# Patient Record
Sex: FEMALE | Race: WHITE | NOT HISPANIC OR LATINO | Employment: OTHER | URBAN - METROPOLITAN AREA
[De-identification: names, ages, dates, MRNs, and addresses within clinical notes are randomized per-mention and may not be internally consistent; named-entity substitution may affect disease eponyms.]

---

## 2018-03-24 ENCOUNTER — RESOLUTE PROFESSIONAL BILLING HOSPITAL PROF FEE (OUTPATIENT)
Dept: HOSPITALIST | Facility: MEDICAL CENTER | Age: 73
End: 2018-03-24
Payer: COMMERCIAL

## 2018-03-24 ENCOUNTER — APPOINTMENT (OUTPATIENT)
Dept: RADIOLOGY | Facility: MEDICAL CENTER | Age: 73
DRG: 193 | End: 2018-03-24
Attending: EMERGENCY MEDICINE
Payer: COMMERCIAL

## 2018-03-24 ENCOUNTER — HOSPITAL ENCOUNTER (INPATIENT)
Facility: MEDICAL CENTER | Age: 73
LOS: 1 days | DRG: 193 | End: 2018-03-25
Attending: EMERGENCY MEDICINE | Admitting: INTERNAL MEDICINE
Payer: COMMERCIAL

## 2018-03-24 DIAGNOSIS — J10.1 INFLUENZA A: ICD-10-CM

## 2018-03-24 DIAGNOSIS — J96.01 ACUTE RESPIRATORY FAILURE WITH HYPOXIA (HCC): ICD-10-CM

## 2018-03-24 PROBLEM — R79.89 ELEVATED BRAIN NATRIURETIC PEPTIDE (BNP) LEVEL: Status: ACTIVE | Noted: 2018-03-24

## 2018-03-24 PROBLEM — G20.A1 PARKINSON DISEASE (HCC): Status: ACTIVE | Noted: 2018-03-24

## 2018-03-24 LAB
ALBUMIN SERPL BCP-MCNC: 3.4 G/DL (ref 3.2–4.9)
ALBUMIN/GLOB SERPL: 1.2 G/DL
ALP SERPL-CCNC: 65 U/L (ref 30–99)
ALT SERPL-CCNC: 30 U/L (ref 2–50)
ANION GAP SERPL CALC-SCNC: 6 MMOL/L (ref 0–11.9)
AST SERPL-CCNC: 32 U/L (ref 12–45)
BASOPHILS # BLD AUTO: 0.3 % (ref 0–1.8)
BASOPHILS # BLD: 0.02 K/UL (ref 0–0.12)
BILIRUB SERPL-MCNC: 0.5 MG/DL (ref 0.1–1.5)
BNP SERPL-MCNC: 209 PG/ML (ref 0–100)
BUN SERPL-MCNC: 17 MG/DL (ref 8–22)
CALCIUM SERPL-MCNC: 8.7 MG/DL (ref 8.4–10.2)
CHLORIDE SERPL-SCNC: 101 MMOL/L (ref 96–112)
CO2 SERPL-SCNC: 25 MMOL/L (ref 20–33)
CREAT SERPL-MCNC: 0.68 MG/DL (ref 0.5–1.4)
EKG IMPRESSION: NORMAL
EOSINOPHIL # BLD AUTO: 0.07 K/UL (ref 0–0.51)
EOSINOPHIL NFR BLD: 1.2 % (ref 0–6.9)
ERYTHROCYTE [DISTWIDTH] IN BLOOD BY AUTOMATED COUNT: 50.5 FL (ref 35.9–50)
FLUAV+FLUBV AG SPEC QL IA: ABNORMAL
GLOBULIN SER CALC-MCNC: 2.8 G/DL (ref 1.9–3.5)
GLUCOSE SERPL-MCNC: 106 MG/DL (ref 65–99)
HCT VFR BLD AUTO: 31.9 % (ref 37–47)
HGB BLD-MCNC: 10.3 G/DL (ref 12–16)
IMM GRANULOCYTES # BLD AUTO: 0.02 K/UL (ref 0–0.11)
IMM GRANULOCYTES NFR BLD AUTO: 0.3 % (ref 0–0.9)
LV EJECT FRACT  99904: 75
LV EJECT FRACT MOD 2C 99903: 76.1
LV EJECT FRACT MOD 4C 99902: 73.2
LV EJECT FRACT MOD BP 99901: 73.67
LYMPHOCYTES # BLD AUTO: 0.58 K/UL (ref 1–4.8)
LYMPHOCYTES NFR BLD: 9.6 % (ref 22–41)
MCH RBC QN AUTO: 28 PG (ref 27–33)
MCHC RBC AUTO-ENTMCNC: 32.3 G/DL (ref 33.6–35)
MCV RBC AUTO: 86.7 FL (ref 81.4–97.8)
MONOCYTES # BLD AUTO: 0.45 K/UL (ref 0–0.85)
MONOCYTES NFR BLD AUTO: 7.4 % (ref 0–13.4)
NEUTROPHILS # BLD AUTO: 4.93 K/UL (ref 2–7.15)
NEUTROPHILS NFR BLD: 81.2 % (ref 44–72)
NRBC # BLD AUTO: 0 K/UL
NRBC BLD-RTO: 0 /100 WBC
PLATELET # BLD AUTO: 258 K/UL (ref 164–446)
PMV BLD AUTO: 8.9 FL (ref 9–12.9)
POTASSIUM SERPL-SCNC: 3.6 MMOL/L (ref 3.6–5.5)
PROT SERPL-MCNC: 6.2 G/DL (ref 6–8.2)
RBC # BLD AUTO: 3.68 M/UL (ref 4.2–5.4)
SIGNIFICANT IND 70042: ABNORMAL
SITE SITE: ABNORMAL
SODIUM SERPL-SCNC: 132 MMOL/L (ref 135–145)
SOURCE SOURCE: ABNORMAL
WBC # BLD AUTO: 6.1 K/UL (ref 4.8–10.8)

## 2018-03-24 PROCEDURE — 85025 COMPLETE CBC W/AUTO DIFF WBC: CPT

## 2018-03-24 PROCEDURE — 36415 COLL VENOUS BLD VENIPUNCTURE: CPT

## 2018-03-24 PROCEDURE — 700102 HCHG RX REV CODE 250 W/ 637 OVERRIDE(OP): Performed by: INTERNAL MEDICINE

## 2018-03-24 PROCEDURE — 93005 ELECTROCARDIOGRAM TRACING: CPT | Performed by: EMERGENCY MEDICINE

## 2018-03-24 PROCEDURE — 700101 HCHG RX REV CODE 250: Performed by: INTERNAL MEDICINE

## 2018-03-24 PROCEDURE — 770020 HCHG ROOM/CARE - TELE (206)

## 2018-03-24 PROCEDURE — 99285 EMERGENCY DEPT VISIT HI MDM: CPT

## 2018-03-24 PROCEDURE — A9270 NON-COVERED ITEM OR SERVICE: HCPCS | Performed by: INTERNAL MEDICINE

## 2018-03-24 PROCEDURE — 80053 COMPREHEN METABOLIC PANEL: CPT

## 2018-03-24 PROCEDURE — 700111 HCHG RX REV CODE 636 W/ 250 OVERRIDE (IP): Performed by: INTERNAL MEDICINE

## 2018-03-24 PROCEDURE — 71045 X-RAY EXAM CHEST 1 VIEW: CPT

## 2018-03-24 PROCEDURE — 700101 HCHG RX REV CODE 250: Performed by: EMERGENCY MEDICINE

## 2018-03-24 PROCEDURE — 94760 N-INVAS EAR/PLS OXIMETRY 1: CPT

## 2018-03-24 PROCEDURE — 93306 TTE W/DOPPLER COMPLETE: CPT | Mod: 26 | Performed by: INTERNAL MEDICINE

## 2018-03-24 PROCEDURE — 83880 ASSAY OF NATRIURETIC PEPTIDE: CPT

## 2018-03-24 PROCEDURE — 93306 TTE W/DOPPLER COMPLETE: CPT

## 2018-03-24 PROCEDURE — 99223 1ST HOSP IP/OBS HIGH 75: CPT | Performed by: INTERNAL MEDICINE

## 2018-03-24 PROCEDURE — 87400 INFLUENZA A/B EACH AG IA: CPT

## 2018-03-24 PROCEDURE — 94640 AIRWAY INHALATION TREATMENT: CPT

## 2018-03-24 RX ORDER — QUETIAPINE FUMARATE 50 MG/1
75 TABLET, FILM COATED ORAL
COMMUNITY

## 2018-03-24 RX ORDER — IPRATROPIUM BROMIDE 42 UG/1
2 SPRAY, METERED NASAL PRN
COMMUNITY

## 2018-03-24 RX ORDER — BUPROPION HYDROCHLORIDE 150 MG/1
450 TABLET ORAL EVERY MORNING
COMMUNITY

## 2018-03-24 RX ORDER — IRBESARTAN 300 MG/1
300 TABLET ORAL NIGHTLY
COMMUNITY

## 2018-03-24 RX ORDER — ALBUTEROL SULFATE 90 UG/1
2 AEROSOL, METERED RESPIRATORY (INHALATION)
Status: DISCONTINUED | OUTPATIENT
Start: 2018-03-24 | End: 2018-03-25

## 2018-03-24 RX ORDER — MULTIVIT WITH MINERALS/LUTEIN
1 TABLET ORAL 3 TIMES DAILY
COMMUNITY

## 2018-03-24 RX ORDER — FLUOXETINE HYDROCHLORIDE 20 MG/1
20 CAPSULE ORAL DAILY
COMMUNITY

## 2018-03-24 RX ORDER — OSELTAMIVIR PHOSPHATE 75 MG/1
75 CAPSULE ORAL ONCE
Status: DISCONTINUED | OUTPATIENT
Start: 2018-03-24 | End: 2018-03-24

## 2018-03-24 RX ORDER — HEPARIN SODIUM 5000 [USP'U]/ML
5000 INJECTION, SOLUTION INTRAVENOUS; SUBCUTANEOUS EVERY 8 HOURS
Status: DISCONTINUED | OUTPATIENT
Start: 2018-03-24 | End: 2018-03-25 | Stop reason: HOSPADM

## 2018-03-24 RX ORDER — BISACODYL 10 MG
10 SUPPOSITORY, RECTAL RECTAL
Status: DISCONTINUED | OUTPATIENT
Start: 2018-03-24 | End: 2018-03-25 | Stop reason: HOSPADM

## 2018-03-24 RX ORDER — DEXTROAMPHETAMINE SACCHARATE, AMPHETAMINE ASPARTATE, DEXTROAMPHETAMINE SULFATE AND AMPHETAMINE SULFATE 2.5; 2.5; 2.5; 2.5 MG/1; MG/1; MG/1; MG/1
15 TABLET ORAL 2 TIMES DAILY
Status: DISCONTINUED | OUTPATIENT
Start: 2018-03-24 | End: 2018-03-25

## 2018-03-24 RX ORDER — DEXTROAMPHETAMINE SACCHARATE, AMPHETAMINE ASPARTATE, DEXTROAMPHETAMINE SULFATE AND AMPHETAMINE SULFATE 3.75; 3.75; 3.75; 3.75 MG/1; MG/1; MG/1; MG/1
15 TABLET ORAL 2 TIMES DAILY
COMMUNITY

## 2018-03-24 RX ORDER — ACETAMINOPHEN 325 MG/1
650 TABLET ORAL EVERY 6 HOURS PRN
Status: DISCONTINUED | OUTPATIENT
Start: 2018-03-24 | End: 2018-03-25 | Stop reason: HOSPADM

## 2018-03-24 RX ORDER — SELEGILINE HYDROCHLORIDE 5 MG/1
5 TABLET ORAL 2 TIMES DAILY
Status: DISCONTINUED | OUTPATIENT
Start: 2018-03-24 | End: 2018-03-25 | Stop reason: HOSPADM

## 2018-03-24 RX ORDER — SELEGILINE HYDROCHLORIDE 5 MG/1
5 TABLET ORAL 2 TIMES DAILY
COMMUNITY

## 2018-03-24 RX ORDER — BUPROPION HYDROCHLORIDE 100 MG/1
200 TABLET, EXTENDED RELEASE ORAL 2 TIMES DAILY
Status: DISCONTINUED | OUTPATIENT
Start: 2018-03-25 | End: 2018-03-25

## 2018-03-24 RX ORDER — IPRATROPIUM BROMIDE AND ALBUTEROL SULFATE 2.5; .5 MG/3ML; MG/3ML
3 SOLUTION RESPIRATORY (INHALATION)
Status: DISCONTINUED | OUTPATIENT
Start: 2018-03-24 | End: 2018-03-25 | Stop reason: HOSPADM

## 2018-03-24 RX ORDER — MULTIVIT-MIN/IRON/FOLIC ACID/K 18-600-40
2000 CAPSULE ORAL 3 TIMES DAILY
COMMUNITY

## 2018-03-24 RX ORDER — IRBESARTAN 150 MG/1
300 TABLET ORAL NIGHTLY
Status: DISCONTINUED | OUTPATIENT
Start: 2018-03-24 | End: 2018-03-25 | Stop reason: HOSPADM

## 2018-03-24 RX ORDER — AMOXICILLIN 500 MG/1
500 CAPSULE ORAL 2 TIMES DAILY
Status: ON HOLD | COMMUNITY
End: 2018-03-24 | Stop reason: CLARIF

## 2018-03-24 RX ORDER — BUPROPION HYDROCHLORIDE 150 MG/1
450 TABLET ORAL EVERY MORNING
Status: DISCONTINUED | OUTPATIENT
Start: 2018-03-24 | End: 2018-03-24

## 2018-03-24 RX ORDER — OSELTAMIVIR PHOSPHATE 75 MG/1
75 CAPSULE ORAL EVERY 12 HOURS
Status: DISCONTINUED | OUTPATIENT
Start: 2018-03-24 | End: 2018-03-25 | Stop reason: HOSPADM

## 2018-03-24 RX ORDER — FLUOXETINE HYDROCHLORIDE 20 MG/1
20 CAPSULE ORAL DAILY
Status: DISCONTINUED | OUTPATIENT
Start: 2018-03-25 | End: 2018-03-25 | Stop reason: HOSPADM

## 2018-03-24 RX ORDER — POLYETHYLENE GLYCOL 3350 17 G/17G
1 POWDER, FOR SOLUTION ORAL
Status: DISCONTINUED | OUTPATIENT
Start: 2018-03-24 | End: 2018-03-25 | Stop reason: HOSPADM

## 2018-03-24 RX ORDER — AMOXICILLIN 250 MG
2 CAPSULE ORAL 2 TIMES DAILY
Status: DISCONTINUED | OUTPATIENT
Start: 2018-03-24 | End: 2018-03-25 | Stop reason: HOSPADM

## 2018-03-24 RX ORDER — QUETIAPINE FUMARATE 25 MG/1
75 TABLET, FILM COATED ORAL
Status: DISCONTINUED | OUTPATIENT
Start: 2018-03-24 | End: 2018-03-25 | Stop reason: HOSPADM

## 2018-03-24 RX ORDER — GUAIFENESIN/DEXTROMETHORPHAN 100-10MG/5
10 SYRUP ORAL EVERY 6 HOURS PRN
Status: DISCONTINUED | OUTPATIENT
Start: 2018-03-24 | End: 2018-03-25 | Stop reason: HOSPADM

## 2018-03-24 RX ORDER — DOCUSATE SODIUM 100 MG/1
200 CAPSULE, LIQUID FILLED ORAL
COMMUNITY

## 2018-03-24 RX ADMIN — ACETAMINOPHEN 650 MG: 325 TABLET, FILM COATED ORAL at 15:16

## 2018-03-24 RX ADMIN — DOCUSATE SODIUM AND SENNOSIDES 2 TABLET: 8.6; 5 TABLET, FILM COATED ORAL at 21:18

## 2018-03-24 RX ADMIN — IRBESARTAN 300 MG: 150 TABLET ORAL at 21:18

## 2018-03-24 RX ADMIN — ALBUTEROL SULFATE 2.5 MG: 2.5 SOLUTION RESPIRATORY (INHALATION) at 13:34

## 2018-03-24 RX ADMIN — HEPARIN SODIUM 5000 UNITS: 5000 INJECTION, SOLUTION INTRAVENOUS; SUBCUTANEOUS at 21:19

## 2018-03-24 RX ADMIN — HEPARIN SODIUM 5000 UNITS: 5000 INJECTION, SOLUTION INTRAVENOUS; SUBCUTANEOUS at 15:20

## 2018-03-24 RX ADMIN — QUETIAPINE FUMARATE 75 MG: 25 TABLET ORAL at 21:18

## 2018-03-24 RX ADMIN — IPRATROPIUM BROMIDE AND ALBUTEROL SULFATE 3 ML: .5; 3 SOLUTION RESPIRATORY (INHALATION) at 19:35

## 2018-03-24 RX ADMIN — IPRATROPIUM BROMIDE AND ALBUTEROL SULFATE 3 ML: .5; 3 SOLUTION RESPIRATORY (INHALATION) at 23:55

## 2018-03-24 RX ADMIN — IPRATROPIUM BROMIDE AND ALBUTEROL SULFATE 3 ML: .5; 3 SOLUTION RESPIRATORY (INHALATION) at 16:43

## 2018-03-24 RX ADMIN — ACETAMINOPHEN 650 MG: 325 TABLET, FILM COATED ORAL at 21:28

## 2018-03-24 RX ADMIN — AMPICILLIN 500 MG: 250 CAPSULE ORAL at 22:00

## 2018-03-24 RX ADMIN — OSELTAMIVIR PHOSPHATE 75 MG: 75 CAPSULE ORAL at 21:18

## 2018-03-24 RX ADMIN — OSELTAMIVIR PHOSPHATE 75 MG: 75 CAPSULE ORAL at 15:16

## 2018-03-24 ASSESSMENT — ENCOUNTER SYMPTOMS
ORTHOPNEA: 0
DEPRESSION: 0
ABDOMINAL PAIN: 0
MYALGIAS: 0
HEARTBURN: 0
SEIZURES: 0
NECK PAIN: 0
HEADACHES: 0
WEIGHT LOSS: 0
FOCAL WEAKNESS: 0
INSOMNIA: 0
PALPITATIONS: 0
BLURRED VISION: 0
FEVER: 0
CHILLS: 1
SPUTUM PRODUCTION: 1
EYE PAIN: 0
SHORTNESS OF BREATH: 1
DIARRHEA: 0
EYE REDNESS: 0
NAUSEA: 0
EYE DISCHARGE: 0
NERVOUS/ANXIOUS: 0
COUGH: 1
DIZZINESS: 0
STRIDOR: 0
VOMITING: 0
BACK PAIN: 0

## 2018-03-24 ASSESSMENT — LIFESTYLE VARIABLES
EVER_SMOKED: NEVER
EVER_SMOKED: NEVER
ALCOHOL_USE: NO

## 2018-03-24 ASSESSMENT — PATIENT HEALTH QUESTIONNAIRE - PHQ9
SUM OF ALL RESPONSES TO PHQ9 QUESTIONS 1 AND 2: 0
1. LITTLE INTEREST OR PLEASURE IN DOING THINGS: NOT AT ALL
2. FEELING DOWN, DEPRESSED, IRRITABLE, OR HOPELESS: NOT AT ALL

## 2018-03-24 ASSESSMENT — PAIN SCALES - GENERAL
PAINLEVEL_OUTOF10: 0
PAINLEVEL_OUTOF10: 4
PAINLEVEL_OUTOF10: 7
PAINLEVEL_OUTOF10: 7
PAINLEVEL_OUTOF10: 0

## 2018-03-24 NOTE — H&P
" Hospital Medicine History and Physical      Date of Service  3/24/2018    Chief Complaint  Chief Complaint   Patient presents with   • Shortness of Breath     x 1.5 days. \"I can't get a deep breath\". Denies CP.    • Cough     Producing light green yellow phlegm.    • Head Ache     Beginning yesterday.    • Stiff Neck     Began last evening - able to move freely.        History of Presenting Illness  Talha is a 72 y.o. female PMH of parkinson disease, HTN, who presents with cough with yellowish sputum production, SOB for the past 2 days. She felt really weak and complaint about muscle ache. In the ER she was found to have acute hypoxic respiratory failure and influenza A. Denies chest pain, palpitation. Associated with chills.    Primary Care Physician  No primary care provider on file.      Code Status  Full code    Review of Systems  Review of Systems   Constitutional: Positive for chills. Negative for fever and weight loss.   HENT: Negative for congestion and nosebleeds.    Eyes: Negative for blurred vision, pain, discharge and redness.   Respiratory: Positive for cough, sputum production and shortness of breath. Negative for stridor.    Cardiovascular: Negative for chest pain, palpitations and orthopnea.   Gastrointestinal: Negative for abdominal pain, diarrhea, heartburn, nausea and vomiting.   Genitourinary: Negative for dysuria, frequency and urgency.   Musculoskeletal: Negative for back pain, myalgias and neck pain.   Skin: Negative for itching and rash.   Neurological: Negative for dizziness, focal weakness, seizures and headaches.   Psychiatric/Behavioral: Negative for depression. The patient is not nervous/anxious and does not have insomnia.      Please see HPI, all other systems were reviewed and are negative (AMA/CMS criteria)     Past Medical History  Past Medical History:   Diagnosis Date   • Parkinson disease (CMS-HCC) 2017   • Hypertension        Surgical History  No pertinent surgical " "history    Medications  reviewed  Family History  History reviewed. No pertinent family history.      Social History  Social History   Substance Use Topics   • Smoking status: Never Smoker   • Smokeless tobacco: Never Used   • Alcohol use Yes      Comment: rare       Allergies  Allergies   Allergen Reactions   • Codeine Shortness of Breath     \" I hallucinate and I can't breathe\"   • [place] Itching     \" like internal poison ivy\"        Physical Exam  Laboratory   Hemodynamics  Temp (24hrs), Av.3 °C (97.3 °F), Min:36.3 °C (97.3 °F), Max:36.3 °C (97.3 °F)   Temperature: 36.3 °C (97.3 °F)  Pulse  Av  Min: 79  Max: 81 Heart Rate (Monitored): 79  Blood Pressure : 153/77, NIBP: 143/67      Respiratory      Respiration: 20 (post 20), Pulse Oximetry: 97 %, O2 Daily Delivery Respiratory : Nasal Cannula     Given By:: Mask, Work Of Breathing / Effort: Mild  RUL Breath Sounds: Diminished, RML Breath Sounds: Diminished, RLL Breath Sounds: Diminished, [place] Breath Sounds: Diminished, LLL Breath Sounds: Diminished    Physical Exam   Constitutional: She is oriented to person, place, and time. No distress.   HENT:   Head: Normocephalic and atraumatic.   Mouth/Throat: Oropharynx is clear and moist.   Eyes: Conjunctivae and EOM are normal. Pupils are equal, round, and reactive to light.   Neck: Normal range of motion. Neck supple. No tracheal deviation present. No thyromegaly present.   Cardiovascular: Normal rate and regular rhythm.    No murmur heard.  Pulmonary/Chest: Effort normal. No respiratory distress. She has wheezes. She has rales.   Abdominal: Soft. Bowel sounds are normal. She exhibits no distension. There is no tenderness.   Musculoskeletal: She exhibits no edema or tenderness.   Neurological: She is alert and oriented to person, place, and time. No cranial nerve deficit.   Skin: Skin is warm and dry. She is not diaphoretic. No erythema.   Psychiatric: She has a normal mood and affect. Her behavior is " normal. Thought content normal.       Recent Labs      03/24/18   1309   WBC  6.1   RBC  3.68*   HEMOGLOBIN  10.3*   HEMATOCRIT  31.9*   MCV  86.7   MCH  28.0   MCHC  32.3*   RDW  50.5*   PLATELETCT  258   MPV  8.9*     Recent Labs      03/24/18   1309   SODIUM  132*   POTASSIUM  3.6   CHLORIDE  101   CO2  25   GLUCOSE  106*   BUN  17   CREATININE  0.68   CALCIUM  8.7     Recent Labs      03/24/18   1309   ALTSGPT  30   ASTSGOT  32   ALKPHOSPHAT  65   TBILIRUBIN  0.5   GLUCOSE  106*         Recent Labs      03/24/18   1309   BNPBTYPENAT  209*         No results found for: TROPONINI    Imaging  DX-CHEST-PORTABLE (1 VIEW)   Final Result      1.  Cardiomegaly.      2.  Mild interstitial edema and bibasilar atelectasis.      ECHOCARDIOGRAM COMP W/O CONT    (Results Pending)        Assessment/Plan     I anticipate this patient will require at least two midnights for appropriate medical management, necessitating inpatient admission.    Influenza A- (present on admission)   Assessment & Plan    Started on tamiflu for 5 days  Breathing treatment with duoneb, albuterol  o2 via NC(titrate as needed)  Droplets isolation        Elevated brain natriuretic peptide (BNP) level- (present on admission)   Assessment & Plan    Mild bibasilar rales +  Check echo  Started on IV lasix 20 mg bid: titrate as needed        Parkinson disease (CMS-HCC)- (present on admission)   Assessment & Plan    Continue outpatient med        Acute respiratory failure with hypoxia (CMS-HCC)- (present on admission)   Assessment & Plan    From above  Plan as above            Prophylaxis:  sc heparin

## 2018-03-24 NOTE — ED NOTES
Med rec complete per medication list at bedside  Allergies reviewed    Patient is always on Amoxicillin prophylaxis

## 2018-03-24 NOTE — ED PROVIDER NOTES
"ED Provider Note    CHIEF COMPLAINT  Chief Complaint   Patient presents with   • Shortness of Breath     x 1.5 days. \"I can't get a deep breath\". Denies CP.    • Cough     Producing light green yellow phlegm.    • Head Ache     Beginning yesterday.    • Stiff Neck     Began last evening - able to move freely.        HPI  Clayton Palencia is a 72 y.o. female who presents with shortness of breath. This has been going on for the last 2 days. She feels that she can't get a deep breath but denies any chest pain. She feels like she is wheezing. She has started having a cough that's directive of some yellow phlegm. She also has a headache. No history of lung problems other than pneumonia a few times. She has a history of Parkinson's. She is from [place] but has been here for the last few weeks.        REVIEW OF SYSTEMS  positive for shortness of breath wheezing low-grade fevers, negative for chest pain. All other systems are negative.     PAST MEDICAL HISTORY   has a past medical history of Hypertension and Parkinson disease (CMS-[facility]) (2017).    SOCIAL HISTORY  Social History     Social History Main Topics   • Smoking status: Never Smoker   • Smokeless tobacco: Never Used   • Alcohol use Yes      Comment: rare   • Drug use: No   • Sexual activity: Not on file       SURGICAL HISTORY  patient denies any surgical history    CURRENT MEDICATIONS  Home Medications     Reviewed by Delfina Adams R.N. (Registered Nurse) on 03/24/18 at 1459  Med List Status: Complete   Medication Last Dose Status   amoxicillin (AMOXIL) 500 MG Cap 3/24/2018 Active   amphetamine-dextroamphetamine (ADDERALL) 15 MG tablet 3/24/2018 Active   Ascorbic Acid (VITAMIN C) 1000 MG Tab 3/23/2018 Active   B Complex Vitamins (VITAMIN B COMPLEX PO) 3/24/2018 Active   buPROPion (WELLBUTRIN XL) 150 MG XL tablet 3/24/2018 Active   Cholecalciferol (VITAMIN D) 2000 units Cap 3/24/2018 Active   docusate sodium (COLACE) 100 MG Cap 3/23/2018 Active   FLUoxetine (PROZAC) 20 " "MG Cap 3/24/2018 Active   ipratropium (ATROVENT) 0.06 % Solution 3/22/2018 Active   irbesartan (AVAPRO) 300 MG Tab 3/23/2018 Active   Misc Natural Products (COLON CLEANSE) Cap 3/23/2018 Active   quetiapine (SEROQUEL) 50 MG tablet 3/23/2018 Active   selegiline (ELDEPRYL) 5 MG Tab 3/24/2018 Active                ALLERGIES  Allergies   Allergen Reactions   • Codeine Shortness of Breath     \" I hallucinate and I can't breathe\"   • [place] Flavor Itching     \" like internal poison ivy\"       PHYSICAL EXAM  VITAL SIGNS: /56   Pulse 77   Temp 36.9 °C (98.4 °F)   Resp 18   Ht 1.499 m (4' 11\")   Wt 73.8 kg (162 lb 11.2 oz)   SpO2 98%   Breastfeeding? No   BMI 32.86 kg/m² .  Constitutional: Alert in no apparent distress.  HENT: No signs of trauma, Bilateral external ears normal, Nose normal.   Eyes: Pupils are equal and reactive, Conjunctiva normal, Non-icteric.   Neck: Normal range of motion, No tenderness, Supple, No stridor.   Cardiovascular: Regular rate and rhythm, no murmurs.   Thorax & Lungs: Rales bilaterally worse on the left with decreased air movement throughout  Abdomen: Bowel sounds normal, Soft, No tenderness, No masses, No peritoneal signs.  Skin: Warm, Dry, No erythema, No rash.   Musculoskeletal:  no major deformities noted.   Neurologic: Alert,  No focal deficits noted.   Psychiatric: Affect normal, Judgment normal, Mood normal.       DIAGNOSTIC STUDIES / PROCEDURES      EKG  12 Lead EKG interpreted by me to show:  Normal sinus rhythm  Rate 81  Axis: Normal  Intervals: Normal  Inverted T-wave in lead 3  Normal ST segments  My impression of this EKG: Nonspecific T-wave change in lead 3 no acute ischemia.    LABS  Labs Reviewed   CBC WITH DIFFERENTIAL - Abnormal; Notable for the following:        Result Value    RBC 3.68 (*)     Hemoglobin 10.3 (*)     Hematocrit 31.9 (*)     MCHC 32.3 (*)     RDW 50.5 (*)     MPV 8.9 (*)     Neutrophils-Polys 81.20 (*)     Lymphocytes 9.60 (*)     Lymphs " (Absolute) 0.58 (*)     All other components within normal limits   COMP METABOLIC PANEL - Abnormal; Notable for the following:     Sodium 132 (*)     Glucose 106 (*)     All other components within normal limits   INFLUENZA RAPID - Abnormal; Notable for the following:     Significant Indicator POS (*)     Rapid Influenza A-B   (*)     Value: Positive for Influenza A antigen;  Negative for Influenza B antigen.      All other components within normal limits    Narrative:     CALL  [facility] tel. [phone], FAXED TO [facility]  CALLED  [facility] tel. [phone] 03/24/2018, 13:50, RB PERF. RESULTS CALLED TO:  STEFFI NASSAR   BTYPE NATRIURETIC PEPTIDE - Abnormal; Notable for the following:     B Natriuretic Peptide 209 (*)     All other components within normal limits   ESTIMATED GFR       RADIOLOGY  ECHOCARDIOGRAM COMP W/O CONT         DX-CHEST-PORTABLE (1 VIEW)   Final Result      1.  Cardiomegaly.      2.  Mild interstitial edema and bibasilar atelectasis.              COURSE & MEDICAL DECISION MAKING  Pertinent Labs & Imaging studies reviewed. (See chart for details)  This is a 72-year-old female that presents with shortness of breath and body aches for the last few days. Differential includes but is not limited to pneumonia, influenza, dehydration.    Labs were obtained. She does have influenza A. X-rays negative. She did feel slightly better after nebulizer treatment. She is still requiring spot oxygen as she is hypoxic at baseline. Labs are otherwise unremarkable. Given that she is hypoxic she will be admitted. She was started on Tamiflu. She is agreeable to this plan. I spoke with Dr. Quach, hospitalist, who is agreeable to admit. Patient will be admitted in guarded condition.        FINAL IMPRESSION  1. Influenza A    2. Acute respiratory failure with hypoxia (CMS-HCC)          This dictation has been creating using voice recognition software. The accuracy of the dictation is limited the abilities of the software.  I  expect there may be some errors of grammar and possibly content. I made every attempt to manually correct the errors within my dictation. However errors related to this voice recognition software may still exist and should be interpreted within the appropriate context.      The note accurately reflects work and decisions made by me.  Clari Auguste  3/24/2018  4:24 PM

## 2018-03-24 NOTE — PROGRESS NOTES
Pt arrived to the unit via gurney escorted by transport:  VSS, patient is in SR (sinus rhythm). Assessment complete. A&O x 4. No signs of distress noted at this time. Tele monitor in place, monitor room notified. Pt denies pain. Pt is oriented to the unit, call light, phone system. Fall precautions in place and appropriate signs in place. Call light within reach. Bed is locked and in the lowest position. Pt is educated regarding fall precautions and importance of calling the staff for assistance. Pt denies any additional needs at this time. Will continue to monitor.

## 2018-03-24 NOTE — ASSESSMENT & PLAN NOTE
Started on tamiflu for 5 days  Breathing treatment with duoneb, albuterol  o2 via NC(titrate as needed)  Droplets isolation

## 2018-03-24 NOTE — FLOWSHEET NOTE
03/24/18 1339   Events/Summary/Plan   Events/Summary/Plan SVN given in ER   Interdisciplinary Plan of Care-Goals (Indications)   Obstructive Ventilatory Defect or Pulmonary Disease without Obvious Obstruction Strong Subjective / Objective Improvement   Interdisciplinary Plan of Care-Outcomes    Bronchodilator Outcome Improved Vital Signs and Measures of Gas Exchange   Education   Education Yes - Pt. / Family has been Instructed in use of Respiratory Equipment;Yes - Pt. / Family has been Instructed in use of Respiratory Medications and Adverse Reactions   SVN Group   #SVN Performed Yes   Given By: Mask   Respiratory WDL   Respiratory (WDL) X   Chest Exam   Work Of Breathing / Effort Mild   Respiration 20  (post 20)   Pulse 79  (post 79)   Heart Rate (Monitored) 79   Breath Sounds   Pre/Post Intervention Pre Intervention Assessment  (increased aeration after svn)   RUL Breath Sounds Diminished   RML Breath Sounds Diminished   RLL Breath Sounds Diminished   [place] Breath Sounds Diminished   LLL Breath Sounds Diminished   Oximetry   #Pulse Oximetry (Single Determination) Yes   Oxygen   Home O2 Use Prior To Admission? No   Pulse Oximetry 97 %   O2 (LPM) 2   O2 Daily Delivery Respiratory  Nasal Cannula

## 2018-03-24 NOTE — FLOWSHEET NOTE
03/24/18 1647   Events/Summary/Plan   Events/Summary/Plan svn given   Interdisciplinary Plan of Care-Goals (Indications)   Obstructive Ventilatory Defect or Pulmonary Disease without Obvious Obstruction Strong Subjective / Objective Improvement   Interdisciplinary Plan of Care-Outcomes    Bronchodilator Outcome Improved Patient Appearance with Decreased use of Accessory Muscles   SVN Group   #SVN Performed Yes   Given By: Mouthpiece   Date SVN Last Changed 03/24/18   Date SVN Next Change Due (Q 7 Days) 03/31/18   Chest Exam   Work Of Breathing / Effort Mild   Respiration 18  (post 18)   Pulse 80  (post 81)   Heart Rate (Monitored) 80   Breath Sounds   Pre/Post Intervention Pre Intervention Assessment  (increased aeration following svn)   RUL Breath Sounds Diminished   RML Breath Sounds Diminished   RLL Breath Sounds Diminished   [place] Breath Sounds Diminished   LLL Breath Sounds Diminished   Oximetry   #Pulse Oximetry (Single Determination) Yes   Oxygen   Pulse Oximetry 93 %   O2 Daily Delivery Respiratory  Room Air with O2 Available

## 2018-03-24 NOTE — CARE PLAN
Problem: Safety  Goal: Will remain free from injury    Intervention: Provide assistance with mobility  Pt mobility assessed at beginning of shift, pt requires standby assist and cane with ambulation.  Fall precautions in place, non-slip socks on, bed in lowest, locked position and call light is within reach.  Pt educated to call for assistance and verbalizes understanding.       Problem: Infection  Goal: Will remain free from infection    Intervention: Assess signs and symptoms of infection  Pt monitored for signs and symptoms of infection. Vitals and labs assessed and monitored for signs of infection. Proper hand hygiene performed prior to entering and exiting pt's room. Pt on Tamiflu and droplet precautions for positive Flu A.  Pt educated on proper hand hygiene.      Problem: Pain Management  Goal: Pain level will decrease to patient's comfort goal    Intervention: Follow pain managment plan developed in collaboration with patient and Interdisciplinary Team  Medicated per MAR, educated on pain scale, implemented non-pharmacological methods: distraction, one-on-one discussion, repositioned and rest.

## 2018-03-25 VITALS
TEMPERATURE: 98 F | SYSTOLIC BLOOD PRESSURE: 147 MMHG | RESPIRATION RATE: 18 BRPM | OXYGEN SATURATION: 93 % | WEIGHT: 162.7 LBS | HEIGHT: 59 IN | BODY MASS INDEX: 32.8 KG/M2 | HEART RATE: 85 BPM | DIASTOLIC BLOOD PRESSURE: 63 MMHG

## 2018-03-25 LAB
ALBUMIN SERPL BCP-MCNC: 3.1 G/DL (ref 3.2–4.9)
ALBUMIN/GLOB SERPL: 1.2 G/DL
ALP SERPL-CCNC: 63 U/L (ref 30–99)
ALT SERPL-CCNC: 28 U/L (ref 2–50)
ANION GAP SERPL CALC-SCNC: 6 MMOL/L (ref 0–11.9)
AST SERPL-CCNC: 31 U/L (ref 12–45)
BILIRUB SERPL-MCNC: 0.3 MG/DL (ref 0.1–1.5)
BUN SERPL-MCNC: 11 MG/DL (ref 8–22)
CALCIUM SERPL-MCNC: 8.3 MG/DL (ref 8.4–10.2)
CHLORIDE SERPL-SCNC: 104 MMOL/L (ref 96–112)
CO2 SERPL-SCNC: 25 MMOL/L (ref 20–33)
CREAT SERPL-MCNC: 0.66 MG/DL (ref 0.5–1.4)
ERYTHROCYTE [DISTWIDTH] IN BLOOD BY AUTOMATED COUNT: 50.3 FL (ref 35.9–50)
GLOBULIN SER CALC-MCNC: 2.5 G/DL (ref 1.9–3.5)
GLUCOSE SERPL-MCNC: 102 MG/DL (ref 65–99)
HCT VFR BLD AUTO: 29.9 % (ref 37–47)
HGB BLD-MCNC: 9.8 G/DL (ref 12–16)
MCH RBC QN AUTO: 28.2 PG (ref 27–33)
MCHC RBC AUTO-ENTMCNC: 32.8 G/DL (ref 33.6–35)
MCV RBC AUTO: 85.9 FL (ref 81.4–97.8)
PLATELET # BLD AUTO: 230 K/UL (ref 164–446)
PMV BLD AUTO: 9 FL (ref 9–12.9)
POTASSIUM SERPL-SCNC: 3.6 MMOL/L (ref 3.6–5.5)
PROT SERPL-MCNC: 5.6 G/DL (ref 6–8.2)
RBC # BLD AUTO: 3.48 M/UL (ref 4.2–5.4)
SODIUM SERPL-SCNC: 135 MMOL/L (ref 135–145)
WBC # BLD AUTO: 5.4 K/UL (ref 4.8–10.8)

## 2018-03-25 PROCEDURE — 700101 HCHG RX REV CODE 250: Performed by: INTERNAL MEDICINE

## 2018-03-25 PROCEDURE — 700102 HCHG RX REV CODE 250 W/ 637 OVERRIDE(OP): Performed by: INTERNAL MEDICINE

## 2018-03-25 PROCEDURE — 94640 AIRWAY INHALATION TREATMENT: CPT

## 2018-03-25 PROCEDURE — 700111 HCHG RX REV CODE 636 W/ 250 OVERRIDE (IP): Performed by: INTERNAL MEDICINE

## 2018-03-25 PROCEDURE — 94760 N-INVAS EAR/PLS OXIMETRY 1: CPT

## 2018-03-25 PROCEDURE — 99239 HOSP IP/OBS DSCHRG MGMT >30: CPT | Performed by: INTERNAL MEDICINE

## 2018-03-25 PROCEDURE — A9270 NON-COVERED ITEM OR SERVICE: HCPCS | Performed by: INTERNAL MEDICINE

## 2018-03-25 PROCEDURE — 80053 COMPREHEN METABOLIC PANEL: CPT

## 2018-03-25 PROCEDURE — 36415 COLL VENOUS BLD VENIPUNCTURE: CPT

## 2018-03-25 PROCEDURE — 99285 EMERGENCY DEPT VISIT HI MDM: CPT

## 2018-03-25 PROCEDURE — 85027 COMPLETE CBC AUTOMATED: CPT

## 2018-03-25 RX ORDER — BUPROPION HYDROCHLORIDE 150 MG/1
450 TABLET, EXTENDED RELEASE ORAL DAILY
Status: DISCONTINUED | OUTPATIENT
Start: 2018-03-25 | End: 2018-03-25

## 2018-03-25 RX ORDER — DEXTROAMPHETAMINE SACCHARATE, AMPHETAMINE ASPARTATE MONOHYDRATE, DEXTROAMPHETAMINE SULFATE AND AMPHETAMINE SULFATE 3.75; 3.75; 3.75; 3.75 MG/1; MG/1; MG/1; MG/1
30 CAPSULE, EXTENDED RELEASE ORAL DAILY
Status: DISCONTINUED | OUTPATIENT
Start: 2018-03-25 | End: 2018-03-25 | Stop reason: HOSPADM

## 2018-03-25 RX ORDER — OSELTAMIVIR PHOSPHATE 75 MG/1
75 CAPSULE ORAL EVERY 12 HOURS
Qty: 8 CAP | Refills: 0 | Status: SHIPPED | OUTPATIENT
Start: 2018-03-25 | End: 2018-03-29

## 2018-03-25 RX ORDER — FUROSEMIDE 20 MG/1
10 TABLET ORAL DAILY
Qty: 5 TAB | Refills: 1 | Status: SHIPPED | OUTPATIENT
Start: 2018-03-25

## 2018-03-25 RX ORDER — ALBUTEROL SULFATE 90 UG/1
2 AEROSOL, METERED RESPIRATORY (INHALATION) EVERY 4 HOURS PRN
Status: DISCONTINUED | OUTPATIENT
Start: 2018-03-25 | End: 2018-03-25 | Stop reason: HOSPADM

## 2018-03-25 RX ORDER — DEXTROAMPHETAMINE SACCHARATE, AMPHETAMINE ASPARTATE, DEXTROAMPHETAMINE SULFATE AND AMPHETAMINE SULFATE 2.5; 2.5; 2.5; 2.5 MG/1; MG/1; MG/1; MG/1
30 TABLET ORAL DAILY
Status: DISCONTINUED | OUTPATIENT
Start: 2018-03-25 | End: 2018-03-25

## 2018-03-25 RX ORDER — GUAIFENESIN/DEXTROMETHORPHAN 100-10MG/5
10 SYRUP ORAL EVERY 6 HOURS PRN
Qty: 560 ML | Refills: 1 | Status: SHIPPED | OUTPATIENT
Start: 2018-03-25

## 2018-03-25 RX ORDER — BUPROPION HYDROCHLORIDE 150 MG/1
150 TABLET ORAL DAILY
Status: DISCONTINUED | OUTPATIENT
Start: 2018-03-25 | End: 2018-03-25 | Stop reason: HOSPADM

## 2018-03-25 RX ORDER — BUPROPION HYDROCHLORIDE 300 MG/1
300 TABLET ORAL DAILY
Status: DISCONTINUED | OUTPATIENT
Start: 2018-03-25 | End: 2018-03-25 | Stop reason: HOSPADM

## 2018-03-25 RX ADMIN — ACETAMINOPHEN 650 MG: 325 TABLET, FILM COATED ORAL at 09:00

## 2018-03-25 RX ADMIN — SELEGILINE HYDROCHLORIDE 5 MG: 5 TABLET ORAL at 11:58

## 2018-03-25 RX ADMIN — IPRATROPIUM BROMIDE AND ALBUTEROL SULFATE 3 ML: .5; 3 SOLUTION RESPIRATORY (INHALATION) at 10:24

## 2018-03-25 RX ADMIN — IPRATROPIUM BROMIDE AND ALBUTEROL SULFATE 3 ML: .5; 3 SOLUTION RESPIRATORY (INHALATION) at 04:01

## 2018-03-25 RX ADMIN — BUPROPION HYDROCHLORIDE 150 MG: 150 TABLET ORAL at 08:55

## 2018-03-25 RX ADMIN — BUPROPION HYDROCHLORIDE 300 MG: 300 TABLET ORAL at 08:56

## 2018-03-25 RX ADMIN — IPRATROPIUM BROMIDE AND ALBUTEROL SULFATE 3 ML: .5; 3 SOLUTION RESPIRATORY (INHALATION) at 06:30

## 2018-03-25 RX ADMIN — HEPARIN SODIUM 5000 UNITS: 5000 INJECTION, SOLUTION INTRAVENOUS; SUBCUTANEOUS at 06:23

## 2018-03-25 RX ADMIN — OSELTAMIVIR PHOSPHATE 75 MG: 75 CAPSULE ORAL at 08:48

## 2018-03-25 RX ADMIN — AMPICILLIN 500 MG: 250 CAPSULE ORAL at 08:52

## 2018-03-25 RX ADMIN — FLUOXETINE 20 MG: 20 CAPSULE ORAL at 08:52

## 2018-03-25 RX ADMIN — DEXTROAMPHETAMINE SACCHARATE, AMPHETAMINE ASPARTATE MONOHYDRATE, DEXTROAMPHETAMINE SULFATE AND AMPHETAMINE SULFATE 30 MG: 3.75; 3.75; 3.75; 3.75 CAPSULE, EXTENDED RELEASE ORAL at 08:54

## 2018-03-25 RX ADMIN — SELEGILINE HYDROCHLORIDE 5 MG: 5 TABLET ORAL at 08:50

## 2018-03-25 ASSESSMENT — PAIN SCALES - GENERAL
PAINLEVEL_OUTOF10: 1
PAINLEVEL_OUTOF10: 2
PAINLEVEL_OUTOF10: 0
PAINLEVEL_OUTOF10: 0

## 2018-03-25 NOTE — PROGRESS NOTES
Assumed pt care from admitting RN. Pt is comfortable in bed with O2 sats mid to high 90s on 1L nc. VSS. No needs at this time. Will cont to mon.

## 2018-03-25 NOTE — PROGRESS NOTES
Pt resting in her bed at this time, continuous pulse ox in place, pt is sating 95% on RA, no evidence of SOB or respiratory distress.

## 2018-03-25 NOTE — PROGRESS NOTES
Morning vitals taken and assessment complete.  Medications given with instructor.  Oxygen titrating for discharge in progress.  Patient sating at 94% on Ra.  Pt is resting comfortably in bed with call light in reach.

## 2018-03-25 NOTE — FLOWSHEET NOTE
03/25/18 1026   Events/Summary/Plan   Events/Summary/Plan svn given   Interdisciplinary Plan of Care-Goals (Indications)   Obstructive Ventilatory Defect or Pulmonary Disease without Obvious Obstruction Strong Subjective / Objective Improvement   Interdisciplinary Plan of Care-Outcomes    Bronchodilator Outcome Patient at Stable Baseline   SVN Group   #SVN Performed Yes   Given By: Mouthpiece   Incentive Spirometry Group   Incentive Spirometer Volume 1250 mL   Chest Exam   Work Of Breathing / Effort Mild   Respiration 16  (post 16)   Pulse 85  (post 86)   Heart Rate (Monitored) 85   Breath Sounds   Pre/Post Intervention Pre Intervention Assessment  (increased aeration after svn)   RUL Breath Sounds Diminished   RML Breath Sounds Diminished   RLL Breath Sounds Diminished   [place] Breath Sounds Diminished   LLL Breath Sounds Diminished   Oximetry   #Pulse Oximetry (Single Determination) Yes   Oxygen   Pulse Oximetry 93 %   O2 Daily Delivery Respiratory  Room Air with O2 Available

## 2018-03-25 NOTE — FLOWSHEET NOTE
03/24/18 1937   Events/Summary/Plan   Events/Summary/Plan SVN    Interdisciplinary Plan of Care-Goals (Indications)   Obstructive Ventilatory Defect or Pulmonary Disease without Obvious Obstruction Strong Subjective / Objective Improvement   Interdisciplinary Plan of Care-Outcomes    Bronchodilator Outcome Improved Patient Appearance with Decreased use of Accessory Muscles   Education   Education Yes - Pt. / Family has been Instructed in use of Respiratory Equipment;Yes - Pt. / Family has been Instructed in use of Respiratory Medications and Adverse Reactions   RT Assessment of Delivered Medications   Evaluation of Medication Delivery Daily Yes-- Pt /Family has been Instructed in use of Respiratory Medications and Adverse Reactions   SVN Group   #SVN Performed Yes   Given By: Mouthpiece   Respiratory WDL   Respiratory (WDL) X   Chest Exam   Respiration 18   Pulse 70   Breath Sounds   Pre/Post Intervention Post Intervention Assessment   RUL Breath Sounds Diminished   RML Breath Sounds Diminished   RLL Breath Sounds Diminished   [place] Breath Sounds Diminished   LLL Breath Sounds Diminished   Oximetry   #Pulse Oximetry (Single Determination) Yes   Oxygen   Pulse Oximetry 95 %   O2 (LPM) 1   O2 Daily Delivery Respiratory  Room Air with O2 Available

## 2018-03-25 NOTE — PROGRESS NOTES
Bedside report completed. Assumed pt care. Pt A&O 4, resting in bed comfortably with no signs of labored breathing on 2L O2. Pt tele monitor in place, cardiac rhythm being monitored. Pt call light within reach, bed in low position, upper bed rails up, non skid socks in place. Pt denies any pain or other distress at this time. Patient was updated on the plan of care for the day. All fall and droplet (positive for flu A) precautions in place. Will continue to monitor.

## 2018-03-25 NOTE — DISCHARGE SUMMARY
"CHIEF COMPLAINT ON ADMISSION  Chief Complaint   Patient presents with   • Shortness of Breath     x 1.5 days. \"I can't get a deep breath\". Denies CP.    • Cough     Producing light green yellow phlegm.    • Head Ache     Beginning yesterday.    • Stiff Neck     Began last evening - able to move freely.        CODE STATUS  Full Code    HPI & HOSPITAL COURSE  This is a 72 y.o. female here with acute respiratory failure with hypoxia secondary to influenza A and mild pulmonary edema. She was started on Tamiflu and Lasix along with oxygen supplementation. Patient has taped been tapered off of oxygen supplementation with improvement in respiratory symptoms. She is saturating well on room air oxygenation. She will finish her course of Tamiflu for an additional 4 days.    Patient is visiting from [place]. Her  has similar complaints at this time. We have provided a prescription for her  Perez Palencia for a 5 day course of Tamiflu. He is advised to follow-up with his primary care provider urgent care center in one to 2 days.    At this time her symptoms have improved and will be discharged home.    The patient recovered much more quickly than anticipated on admission.    Therefore, she is discharged in good and stable condition with close outpatient follow-up.    SPECIFIC OUTPATIENT FOLLOW-UP  Primary care provider/discharge clinic in one week    DISCHARGE PROBLEM LIST  Active Problems:    Influenza A POA: Yes    Acute respiratory failure with hypoxia (CMS-HCC) POA: Yes    Parkinson disease (CMS-[facility]) POA: Yes    Elevated brain natriuretic peptide (BNP) level POA: Yes  Resolved Problems:    * No resolved hospital problems. *      FOLLOW UP  No future appointments.  No follow-up provider specified.    MEDICATIONS ON DISCHARGE   Clayton Palencia   Home Medication Instructions GRADY:[id number]    Printed on:03/25/18 1206   Medication Information                      amphetamine-dextroamphetamine (ADDERALL) 15 MG " tablet  Take 15 mg by mouth 2 times a day.             Ascorbic Acid (VITAMIN C) 1000 MG Tab  Take 1 Tab by mouth 3 times a day.             B Complex Vitamins (VITAMIN B COMPLEX PO)  Take 1 Tab by mouth 2 Times a Day.             buPROPion (WELLBUTRIN XL) 150 MG XL tablet  Take 450 mg by mouth every morning.             Cholecalciferol (VITAMIN D) 2000 units Cap  Take 2,000 Units by mouth 3 times a day.             docusate sodium (COLACE) 100 MG Cap  Take 200 mg by mouth every bedtime.             FLUoxetine (PROZAC) 20 MG Cap  Take 20 mg by mouth every day.             furosemide (LASIX) 20 MG Tab  Take 0.5 Tabs by mouth every day.             guaiFENesin dextromethorphan (ROBITUSSIN DM) 100-10 MG/5ML Syrup syrup  Take 10 mL by mouth every 6 hours as needed for Cough.             ipratropium (ATROVENT) 0.06 % Solution  Spray 2 Sprays in nose as needed.             irbesartan (AVAPRO) 300 MG Tab  Take 300 mg by mouth every evening.             Misc Natural Products (COLON CLEANSE) Cap  Take 1 Cap by mouth every day.             oseltamivir (TAMIFLU) 75 MG Cap  Take 1 Cap by mouth every 12 hours for 4 days.             quetiapine (SEROQUEL) 50 MG tablet  Take 75 mg by mouth every bedtime.             selegiline (ELDEPRYL) 5 MG Tab  Take 5 mg by mouth 2 Times a Day.                 DIET  Orders Placed This Encounter   Procedures   • Diet Order     Standing Status:   Standing     Number of Occurrences:   1     Order Specific Question:   Diet:     Answer:   Regular [1]       ACTIVITY  As tolerated.  Weight bearing as tolerated      CONSULTATIONS  None    PROCEDURES  None    LABORATORY  Lab Results   Component Value Date/Time    SODIUM 135 03/25/2018 05:00 AM    POTASSIUM 3.6 03/25/2018 05:00 AM    CHLORIDE 104 03/25/2018 05:00 AM    CO2 25 03/25/2018 05:00 AM    GLUCOSE 102 (H) 03/25/2018 05:00 AM    BUN 11 03/25/2018 05:00 AM    CREATININE 0.66 03/25/2018 05:00 AM        Lab Results   Component Value Date/Time    WBC  5.4 03/25/2018 05:00 AM    HEMOGLOBIN 9.8 (L) 03/25/2018 05:00 AM    HEMATOCRIT 29.9 (L) 03/25/2018 05:00 AM    PLATELETCT 230 03/25/2018 05:00 AM        Total time of the discharge process exceeds 45 minutes

## 2018-03-25 NOTE — FLOWSHEET NOTE
03/25/18 0350   Events/Summary/Plan   Events/Summary/Plan SVn   Interdisciplinary Plan of Care-Goals (Indications)   Obstructive Ventilatory Defect or Pulmonary Disease without Obvious Obstruction Strong Subjective / Objective Improvement   Interdisciplinary Plan of Care-Outcomes    Bronchodilator Outcome Patient at Stable Baseline   Education   Education Yes - Pt. / Family has been Instructed in use of Respiratory Medications and Adverse Reactions;Yes - Pt. / Family has been Instructed in use of Respiratory Equipment   RT Assessment of Delivered Medications   Evaluation of Medication Delivery Daily Yes-- Pt /Family has been Instructed in use of Respiratory Medications and Adverse Reactions   SVN Group   #SVN Performed Yes   Given By: Yue   Respiratory WDL   Respiratory (WDL) X   Chest Exam   Respiration 20   Pulse 81   Breath Sounds   Pre/Post Intervention Post Intervention Assessment   RUL Breath Sounds Diminished   RML Breath Sounds Diminished   RLL Breath Sounds Diminished   [place] Breath Sounds Diminished   LLL Breath Sounds Diminished   Oximetry   #Pulse Oximetry (Single Determination) Yes   Oxygen   Pulse Oximetry 96 %   O2 (LPM) 1   O2 Daily Delivery Respiratory  Nasal Cannula

## 2018-03-25 NOTE — FLOWSHEET NOTE
03/25/18 0634   Events/Summary/Plan   Events/Summary/Plan svn given   Interdisciplinary Plan of Care-Goals (Indications)   Obstructive Ventilatory Defect or Pulmonary Disease without Obvious Obstruction Strong Subjective / Objective Improvement   Interdisciplinary Plan of Care-Outcomes    Bronchodilator Outcome Patient at Stable Baseline   SVN Group   #SVN Performed Yes   Given By: Mask   Chest Exam   Work Of Breathing / Effort Mild   Respiration 16  (post 16)   Pulse 78  (post 78)   Heart Rate (Monitored) 78   Breath Sounds   Pre/Post Intervention Pre Intervention Assessment  (increased aeration following svn)   RUL Breath Sounds Diminished   RML Breath Sounds Diminished   RLL Breath Sounds Diminished   [place] Breath Sounds Diminished   LLL Breath Sounds Diminished   Oximetry   #Pulse Oximetry (Single Determination) Yes   Oxygen   Pulse Oximetry 95 %   O2 (LPM) 2.5   O2 Daily Delivery Respiratory  Nasal Cannula

## 2018-03-25 NOTE — CARE PLAN
Problem: Safety  Goal: Will remain free from injury    Intervention: Provide assistance with mobility  Pt mobility assessed at beginning of shift, pt requires standby assist, steady.  Fall precautions in place, non-slip socks on, bed in lowest, locked position and call light is within reach.  Pt educated to call for assistance and verbalizes understanding.       Problem: Knowledge Deficit  Goal: Knowledge of disease process/condition, treatment plan, diagnostic tests, and medications will improve    Intervention: Assess knowledge level of disease process/condition, treatment plan, diagnostic tests, and medications  Educated pt on disease process, treatment plan and reason for medications she is on.  Pt also educated on how to deep breath and cough efficiently.

## 2018-03-25 NOTE — FLOWSHEET NOTE
03/24/18 7977   Events/Summary/Plan   Events/Summary/Plan svn   Interdisciplinary Plan of Care-Goals (Indications)   Obstructive Ventilatory Defect or Pulmonary Disease without Obvious Obstruction Strong Subjective / Objective Improvement   Interdisciplinary Plan of Care-Outcomes    Bronchodilator Outcome Patient at Stable Baseline   Education   Education Yes - Pt. / Family has been Instructed in use of Respiratory Equipment;Yes - Pt. / Family has been Instructed in use of Respiratory Medications and Adverse Reactions   RT Assessment of Delivered Medications   Evaluation of Medication Delivery Daily Yes-- Pt /Family has been Instructed in use of Respiratory Medications and Adverse Reactions   SVN Group   #SVN Performed Yes   Given By: Mouthpiece   Respiratory WDL   Respiratory (WDL) X   Chest Exam   Respiration 18   Pulse 81   Breath Sounds   Pre/Post Intervention Post Intervention Assessment   RUL Breath Sounds Diminished   RML Breath Sounds Diminished   RLL Breath Sounds Diminished   [place] Breath Sounds Diminished   LLL Breath Sounds Diminished   Oximetry   #Pulse Oximetry (Single Determination) Yes   Oxygen   Pulse Oximetry 96 %   O2 (LPM) 1   O2 Daily Delivery Respiratory  Nasal Cannula

## 2018-03-25 NOTE — PROGRESS NOTES
Diamond hospitalist md for pts request order to continue her Ampicillin  mg 2 x per day, awaiting for return call.

## 2018-03-25 NOTE — PROGRESS NOTES
Pt ambulated with pulse ox by nursing student, pt O2 sat at rest 92-95% on room air, walking hernandez with isolation mask on pt was 89% on room air. Pt did drop to 87% on RA for a second while talking, but this soon increased and stayed at 89-92% O2 sat on room air for the majority of the walk. Pt does not wear O2 at home.

## 2018-03-25 NOTE — DISCHARGE INSTRUCTIONS
Follow up with primary care provider/discharge clinic in one week   Tamiflu ×4 days    Discharge Instructions    Discharged to home by car with relative. Discharged via wheelchair, hospital escort: Yes.  Special equipment needed: Not Applicable    Be sure to schedule a follow-up appointment with your primary care doctor or any specialists as instructed.     Discharge Plan:   Diet Plan: Discussed  Activity Level: Discussed  Confirmed Follow up Appointment: Patient to Call and Schedule Appointment  Confirmed Symptoms Management: Discussed  Medication Reconciliation Updated: No (Comments)  Influenza Vaccine Indication: Not indicated: Previously immunized this influenza season and > 8 years of age    I understand that a diet low in cholesterol, fat, and sodium is recommended for good health. Unless I have been given specific instructions below for another diet, I accept this instruction as my diet prescription.   Other diet: Regular    Special Instructions: None    · Is patient discharged on Warfarin / Coumadin?   No     Depression / Suicide Risk    As you are discharged from this [facility] facility, it is important to learn how to keep safe from harming yourself.    Recognize the warning signs:  · Abrupt changes in personality, positive or negative- including increase in energy   · Giving away possessions  · Change in eating patterns- significant weight changes-  positive or negative  · Change in sleeping patterns- unable to sleep or sleeping all the time   · Unwillingness or inability to communicate  · Depression  · Unusual sadness, discouragement and loneliness  · Talk of wanting to die  · Neglect of personal appearance   · Rebelliousness- reckless behavior  · Withdrawal from people/activities they love  · Confusion- inability to concentrate     If you or a loved one observes any of these behaviors or has concerns about self-harm, here's what you can do:  · Talk about it- your feelings and reasons for harming  "yourself  · Remove any means that you might use to hurt yourself (examples: pills, rope, extension cords, firearm)  · Get professional help from the community (Mental Health, Substance Abuse, psychological counseling)  · Do not be alone:Call your Safe Contact- someone whom you trust who will be there for you.  · Call your local CRISIS HOTLINE 039-0532 or 435-765-6480  · Call your local Children's Mobile Crisis Response Team [place] (215) 007-2728 or [facility] [place]  · Call the toll free National Suicide Prevention Hotlines   · National Suicide Prevention Lifeline 018-682-OBGI (9862)  · National Hope Line Network 800-SUICIDE (783-6097)      Influenza A (H1N1)  H1N1 formerly called \"swine flu\" is a new influenza virus causing sickness in people. The H1N1 virus is different from seasonal influenza viruses. However, the H1N1 symptoms are similar to seasonal influenza and it is spread from person to person. You may be at higher risk for serious problems if you have underlying serious medical conditions. The CDC and the World Health Organization are following reported cases around the world.  CAUSES   · The flu is thought to spread mainly person-to-person through coughing or sneezing of infected people.  · A person may become infected by touching something with the virus on it and then touching their mouth or nose.  SYMPTOMS   · Fever.  · Headache.  · Tiredness.  · Cough.  · Sore throat.  · Runny or stuffy nose.  · Body aches.  · Diarrhea and vomiting  These symptoms are referred to as \"flu-like symptoms.\" A lot of different illnesses, including the common cold, may have similar symptoms.  DIAGNOSIS   · There are tests that can tell if you have the H1N1 virus.  · Confirmed cases of H1N1 will be reported to the state or local health department.  · A doctor's exam may be needed to tell whether you have an infection that is a complication of the flu.  HOME CARE INSTRUCTIONS   · Stay informed. Visit the CDC website " for current recommendations. Visit www.cdc.gov/T7C8byj/. You may also call 6-021-QMA-INFO (1-888.669.9296).  · Get help early if you develop any of the above symptoms.  · If you are at high risk from complications of the flu, talk to your caregiver as soon as you develop flu-like symptoms. Those at higher risk for complications include:  · People 65 years or older.  · People with chronic medical conditions.  · Pregnant women.  · Young children.  · Your caregiver may recommend antiviral medicine to help treat the flu.  · If you get the flu, get plenty of rest, drink enough water and fluids to keep your urine clear or pale yellow, and avoid using alcohol or tobacco.  · You may take over-the-counter medicine to relieve the symptoms of the flu if your caregiver approves. (Never give aspirin to children or teenagers who have flu-like symptoms, particularly fever).  TREATMENT   If you do get sick, antiviral drugs are available. These drugs can make your illness milder and make you feel better faster. Treatment should start soon after illness starts. It is only effective if taken within the first day of becoming ill. Only your caregiver can prescribe antiviral medication.   PREVENTION   · Cover your nose and mouth with a tissue or your arm when you cough or sneeze. Throw the tissue away.  · Wash your hands often with soap and warm water, especially after you cough or sneeze. Alcohol-based  are also effective against germs.  · Avoid touching your eyes, nose or mouth. This is one way germs spread.  · Try to avoid contact with sick people. Follow public health advice regarding school closures. Avoid crowds.  · Stay home if you get sick. Limit contact with others to keep from infecting them. People infected with the H1N1 virus may be able to infect others anywhere from 1 day before feeling sick to 5-7 days after getting flu symptoms.  · An H1N1 vaccine is available to help protect against the virus. In addition to the  H1N1 vaccine, you will need to be vaccinated for seasonal influenza. The H1N1 and seasonal vaccines may be given on the same day. The CDC especially recommends the H1N1 vaccine for:  · Pregnant women.  · People who live with or care for children younger than 6 months of age.  · Health care and emergency services personnel.  · Persons between the ages of 6 months through 24 years of age.  · People from ages 25 through 64 years who are at higher risk for H1N1 because of chronic health disorders or immune system problems.  FACEMASKS  In community and home settings, the use of facemasks and N95 respirators are not normally recommended. In certain circumstances, a facemask or N95 respirator may be used for persons at increased risk of severe illness from influenza. Your caregiver can give additional recommendations for facemask use.  IN CHILDREN, EMERGENCY WARNING SIGNS THAT NEED URGENT MEDICAL CARE:  · Fast breathing or trouble breathing.  · Bluish skin color.  · Not drinking enough fluids.  · Not waking up or not interacting normally.  · Being so fussy that the child does not want to be held.  · Your child has an oral temperature above 102° F (38.9° C), not controlled by medicine.  · Your baby is older than 3 months with a rectal temperature of 102° F (38.9° C) or higher.  · Your baby is 3 months old or younger with a rectal temperature of 100.4° F (38° C) or higher.  · Flu-like symptoms improve but then return with fever and worse cough.  IN ADULTS, EMERGENCY WARNING SIGNS THAT NEED URGENT MEDICAL CARE:  · Difficulty breathing or shortness of breath.  · Pain or pressure in the chest or abdomen.  · Sudden dizziness.  · Confusion.  · Severe or persistent vomiting.  · Bluish color.  · You have a oral temperature above 102° F (38.9° C), not controlled by medicine.  · Flu-like symptoms improve but return with fever and worse cough.  SEEK IMMEDIATE MEDICAL CARE IF:   You or someone you know is experiencing any of the above  symptoms. When you arrive at the emergency center, report that you think you have the flu. You may be asked to wear a mask and/or sit in a secluded area to protect others from getting sick.  MAKE SURE YOU:   · Understand these instructions.  · Will watch your condition.  · Will get help right away if you are not doing well or get worse.  Some of this information courtesy of the CDC.   Document Released: 06/05/2009 Document Revised: 03/11/2013 Document Reviewed: 06/05/2009  ExitCare® Patient Information ©2014 [employer].    Oseltamivir capsules  What is this medicine?  OSELTAMIVIR (os el STREETER i vir) is an antiviral medicine. It is used to prevent and to treat some kinds of influenza or the flu. It will not work for colds or other viral infections.  This medicine may be used for other purposes; ask your health care provider or pharmacist if you have questions.  COMMON BRAND NAME(S): Tamiflu  What should I tell my health care provider before I take this medicine?  They need to know if you have any of the following conditions:  -heart disease  -immune system problems  -kidney disease  -liver disease  -lung disease  -an unusual or allergic reaction to oseltamivir, other medicines, foods, dyes, or preservatives  -pregnant or trying to get pregnant  -breast-feeding  How should I use this medicine?  Take this medicine by mouth with a glass of water. Follow the directions on the prescription label. Start this medicine at the first sign of flu symptoms. You can take it with or without food. If it upsets your stomach, take it with food. Take your medicine at regular intervals. Do not take your medicine more often than directed. Take all of your medicine as directed even if you think you are better. Do not skip doses or stop your medicine early.  Talk to your pediatrician regarding the use of this medicine in children. While this drug may be prescribed for children as young as 14 days for selected conditions, precautions do  apply.  Overdosage: If you think you have taken too much of this medicine contact a poison control center or emergency room at once.  NOTE: This medicine is only for you. Do not share this medicine with others.  What if I miss a dose?  If you miss a dose, take it as soon as you remember. If it is almost time for your next dose (within 2 hours), take only that dose. Do not take double or extra doses.  What may interact with this medicine?  Interactions are not expected.  This list may not describe all possible interactions. Give your health care provider a list of all the medicines, herbs, non-prescription drugs, or dietary supplements you use. Also tell them if you smoke, drink alcohol, or use illegal drugs. Some items may interact with your medicine.  What should I watch for while using this medicine?  Visit your doctor or health care professional for regular check ups. Tell your doctor if your symptoms do not start to get better or if they get worse.  If you have the flu, you may be at an increased risk of developing seizures, confusion, or abnormal behavior. This occurs early in the illness, and more frequently in children and teens. These events are not common, but may result in accidental injury to the patient. Families and caregivers of patients should watch for signs of unusual behavior and contact a doctor or health care professional right away if the patient shows signs of unusual behavior.  This medicine is not a substitute for the flu shot. Talk to your doctor each year about an annual flu shot.  What side effects may I notice from receiving this medicine?  Side effects that you should report to your doctor or health care professional as soon as possible:  -allergic reactions like skin rash, itching or hives, swelling of the face, lips, or tongue  -anxiety, confusion, unusual behavior  -breathing problems  -hallucination, loss of contact with reality  -redness, blistering, peeling or loosening of the  skin, including inside the mouth  -seizures  Side effects that usually do not require medical attention (report to your doctor or health care professional if they continue or are bothersome):  -diarrhea  -headache  -nausea, vomiting  -pain  This list may not describe all possible side effects. Call your doctor for medical advice about side effects. You may report side effects to FDA at 0-738-FDA-2960.  Where should I keep my medicine?  Keep out of the reach of children.  Store at room temperature between 15 and 30 degrees C (59 and 86 degrees F). Throw away any unused medicine after the expiration date.  NOTE: This sheet is a summary. It may not cover all possible information. If you have questions about this medicine, talk to your doctor, pharmacist, or health care provider.  © 2018 Elsevier/Gold Standard (2016-06-22 10:50:39)    Furosemide tablets  What is this medicine?  FUROSEMIDE (fyoor [place] se mide) is a diuretic. It helps you make more urine and to lose salt and excess water from your body. This medicine is used to treat high blood pressure, and edema or swelling from heart, kidney, or liver disease.  This medicine may be used for other purposes; ask your health care provider or pharmacist if you have questions.  COMMON BRAND NAME(S): Active-Medicated Specimen Kit, Delone, Diuscreen, Lasix, RX Specimen Collection Kit, Specimen Collection Kit, URINX Medicated Specimen Collection  What should I tell my health care provider before I take this medicine?  They need to know if you have any of these conditions:  -abnormal blood electrolytes  -diarrhea or vomiting  -gout  -heart disease  -kidney disease, small amounts of urine, or difficulty passing urine  -liver disease  -thyroid disease  -an unusual or allergic reaction to furosemide, sulfa drugs, other medicines, foods, dyes, or preservatives  -pregnant or trying to get pregnant  -breast-feeding  How should I use this medicine?  Take this medicine by mouth with a  glass of water. Follow the directions on the prescription label. You may take this medicine with or without food. If it upsets your stomach, take it with food or milk. Do not take your medicine more often than directed. Remember that you will need to pass more urine after taking this medicine. Do not take your medicine at a time of day that will cause you problems. Do not take at bedtime.  Talk to your pediatrician regarding the use of this medicine in children. While this drug may be prescribed for selected conditions, precautions do apply.  Overdosage: If you think you have taken too much of this medicine contact a poison control center or emergency room at once.  NOTE: This medicine is only for you. Do not share this medicine with others.  What if I miss a dose?  If you miss a dose, take it as soon as you can. If it is almost time for your next dose, take only that dose. Do not take double or extra doses.  What may interact with this medicine?  -aspirin and aspirin-like medicines  -certain antibiotics  -chloral hydrate  -cisplatin  -cyclosporine  -digoxin  -diuretics  -laxatives  -lithium  -medicines for blood pressure  -medicines that relax muscles for surgery  -methotrexate  -NSAIDs, medicines for pain and inflammation like ibuprofen, naproxen, or indomethacin  -phenytoin  -steroid medicines like prednisone or cortisone  -sucralfate  -thyroid hormones  This list may not describe all possible interactions. Give your health care provider a list of all the medicines, herbs, non-prescription drugs, or dietary supplements you use. Also tell them if you smoke, drink alcohol, or use illegal drugs. Some items may interact with your medicine.  What should I watch for while using this medicine?  Visit your doctor or health care professional for regular checks on your progress. Check your blood pressure regularly. Ask your doctor or health care professional what your blood pressure should be, and when you should contact  him or her. If you are a diabetic, check your blood sugar as directed.  You may need to be on a special diet while taking this medicine. Check with your doctor. Also, ask how many glasses of fluid you need to drink a day. You must not get dehydrated.  You may get drowsy or dizzy. Do not drive, use machinery, or do anything that needs mental alertness until you know how this drug affects you. Do not stand or sit up quickly, especially if you are an older patient. This reduces the risk of dizzy or fainting spells. Alcohol can make you more drowsy and dizzy. Avoid alcoholic drinks.  This medicine can make you more sensitive to the sun. Keep out of the sun. If you cannot avoid being in the sun, wear protective clothing and use sunscreen. Do not use sun lamps or tanning beds/booths.  What side effects may I notice from receiving this medicine?  Side effects that you should report to your doctor or health care professional as soon as possible:  -blood in urine or stools  -dry mouth  -fever or chills  -hearing loss or ringing in the ears  -irregular heartbeat  -muscle pain or weakness, cramps  -skin rash  -stomach upset, pain, or nausea  -tingling or numbness in the hands or feet  -unusually weak or tired  -vomiting or diarrhea  -yellowing of the eyes or skin  Side effects that usually do not require medical attention (report to your doctor or health care professional if they continue or are bothersome):  -headache  -loss of appetite  -unusual bleeding or bruising  This list may not describe all possible side effects. Call your doctor for medical advice about side effects. You may report side effects to FDA at 6-343-FDA-1942.  Where should I keep my medicine?  Keep out of the reach of children.  Store at room temperature between 15 and 30 degrees C (59 and 86 degrees F). Protect from light. Throw away any unused medicine after the expiration date.  NOTE: This sheet is a summary. It may not cover all possible information.  If you have questions about this medicine, talk to your doctor, pharmacist, or health care provider.  © 2018 Elsevier/Gold Standard (2016-03-09 13:49:50)

## 2018-04-27 NOTE — ADDENDUM NOTE
Encounter addended by: Hortensia Ahn R.N. on: 4/26/2018  5:06 PM<BR>    Actions taken: Flowsheet accepted, Utilization Review saved

## 2018-04-27 NOTE — ADDENDUM NOTE
Encounter addended by: Hortensia Ahn R.N. on: 4/26/2018  5:07 PM<BR>    Actions taken: Flowsheet accepted

## 2020-11-14 NOTE — PROGRESS NOTES
Discharge instructions given to patient at bedside, verbalizes understanding and states plans for follow-up with doctors. New and home medication review, post-discharge activity level and worsening of symptoms needing follow-up care discussed. Telemetry monitor/IV cathlon removed. All belongings accounted for, all questions answered at this time. Patient wheelchair escorted out to husbands car.    mental health [place]